# Patient Record
Sex: FEMALE | URBAN - METROPOLITAN AREA
[De-identification: names, ages, dates, MRNs, and addresses within clinical notes are randomized per-mention and may not be internally consistent; named-entity substitution may affect disease eponyms.]

---

## 2021-11-30 ENCOUNTER — APPOINTMENT (OUTPATIENT)
Dept: URBAN - METROPOLITAN AREA CLINIC 256 | Age: 66
Setting detail: DERMATOLOGY
End: 2021-11-30

## 2021-11-30 VITALS — WEIGHT: 125 LBS | RESPIRATION RATE: 16 BRPM | HEIGHT: 59 IN

## 2021-11-30 DIAGNOSIS — L21.8 OTHER SEBORRHEIC DERMATITIS: ICD-10-CM

## 2021-11-30 PROCEDURE — 99203 OFFICE O/P NEW LOW 30 MIN: CPT

## 2021-11-30 PROCEDURE — OTHER COUNSELING: OTHER

## 2021-11-30 PROCEDURE — OTHER PRESCRIPTION: OTHER

## 2021-11-30 RX ORDER — DESONIDE 0.5 MG/G
OINTMENT TOPICAL
Qty: 60 | Refills: 3 | Status: ERX | COMMUNITY
Start: 2021-11-30

## 2021-11-30 ASSESSMENT — LOCATION SIMPLE DESCRIPTION DERM: LOCATION SIMPLE: LEFT FOREHEAD

## 2021-11-30 ASSESSMENT — LOCATION ZONE DERM: LOCATION ZONE: FACE

## 2021-11-30 ASSESSMENT — LOCATION DETAILED DESCRIPTION DERM: LOCATION DETAILED: LEFT MEDIAL FOREHEAD

## 2021-12-13 ENCOUNTER — APPOINTMENT (OUTPATIENT)
Dept: URBAN - METROPOLITAN AREA CLINIC 256 | Age: 66
Setting detail: DERMATOLOGY
End: 2021-12-13

## 2021-12-13 VITALS — HEIGHT: 60 IN | RESPIRATION RATE: 16 BRPM | WEIGHT: 125 LBS

## 2021-12-13 DIAGNOSIS — L21.8 OTHER SEBORRHEIC DERMATITIS: ICD-10-CM

## 2021-12-13 PROCEDURE — OTHER PRESCRIPTION: OTHER

## 2021-12-13 PROCEDURE — 99213 OFFICE O/P EST LOW 20 MIN: CPT

## 2021-12-13 PROCEDURE — OTHER COUNSELING: OTHER

## 2021-12-13 RX ORDER — FLUTICASONE PROPIONATE 0.05 MG/G
0.005% OINTMENT TOPICAL BID
Qty: 15 | Refills: 3 | Status: ERX | COMMUNITY
Start: 2021-12-13

## 2021-12-13 ASSESSMENT — LOCATION SIMPLE DESCRIPTION DERM
LOCATION SIMPLE: CHIN
LOCATION SIMPLE: LEFT FOREHEAD
LOCATION SIMPLE: RIGHT EAR
LOCATION SIMPLE: LEFT NOSE
LOCATION SIMPLE: RIGHT NOSE
LOCATION SIMPLE: LEFT CHEEK
LOCATION SIMPLE: LEFT INFERIOR EYELID
LOCATION SIMPLE: RIGHT EYEBROW

## 2021-12-13 ASSESSMENT — LOCATION DETAILED DESCRIPTION DERM
LOCATION DETAILED: LEFT INFERIOR CENTRAL MALAR CHEEK
LOCATION DETAILED: RIGHT NASAL ALAR GROOVE
LOCATION DETAILED: RIGHT CENTRAL EYEBROW
LOCATION DETAILED: LEFT MEDIAL FOREHEAD
LOCATION DETAILED: LEFT NASAL ALA
LOCATION DETAILED: LEFT LATERAL INFERIOR EYELID
LOCATION DETAILED: LEFT CHIN
LOCATION DETAILED: RIGHT CHIN
LOCATION DETAILED: RIGHT SCAPHA

## 2021-12-13 ASSESSMENT — LOCATION ZONE DERM
LOCATION ZONE: EYELID
LOCATION ZONE: NOSE
LOCATION ZONE: EAR
LOCATION ZONE: FACE

## 2023-01-18 ENCOUNTER — TRANSFERRED RECORDS (OUTPATIENT)
Dept: HEALTH INFORMATION MANAGEMENT | Facility: CLINIC | Age: 68
End: 2023-01-18

## 2023-02-14 ENCOUNTER — TRANSCRIBE ORDERS (OUTPATIENT)
Dept: OTHER | Age: 68
End: 2023-02-14

## 2023-02-14 DIAGNOSIS — L30.0 NUMMULAR ECZEMA: Primary | ICD-10-CM

## 2023-02-14 DIAGNOSIS — L30.8 SPONGIOTIC DERMATITIS: ICD-10-CM

## 2023-05-25 NOTE — TELEPHONE ENCOUNTER
FUTURE VISIT INFORMATION      FUTURE VISIT INFORMATION:    Date: 8.22.23    Time: 1:00    Location: [facility]  REFERRAL INFORMATION:    Referring provider:  Backer    Referring providers clinic:  [facility]    Reason for visit/diagnosis  Nummular eczema; Spongiotic dermatitis; Referred to Dr Cruz - Pt has nummular eczema unresponsive to topical therapy & subacute spongiotic derm with some Eos biopsied on 2 separate occasions per Pt Y[place]    RECORDS REQUESTED FROM:       Clinic name Comments Records Status Imaging Status   [facility] 1.18.23  Backer Received  In Epic

## 2023-08-22 ENCOUNTER — PRE VISIT (OUTPATIENT)
Dept: ALLERGY | Facility: CLINIC | Age: 68
End: 2023-08-22